# Patient Record
Sex: MALE | URBAN - METROPOLITAN AREA
[De-identification: names, ages, dates, MRNs, and addresses within clinical notes are randomized per-mention and may not be internally consistent; named-entity substitution may affect disease eponyms.]

---

## 2023-02-09 ENCOUNTER — HOSPITAL ENCOUNTER (EMERGENCY)
Age: 58
Discharge: HOME | End: 2023-02-09
Attending: EMERGENCY MEDICINE
Payer: COMMERCIAL

## 2023-02-09 VITALS
HEIGHT: 74 IN | WEIGHT: 190 LBS | RESPIRATION RATE: 18 BRPM | OXYGEN SATURATION: 98 % | DIASTOLIC BLOOD PRESSURE: 73 MMHG | TEMPERATURE: 98 F | SYSTOLIC BLOOD PRESSURE: 142 MMHG | HEART RATE: 107 BPM | BODY MASS INDEX: 24.38 KG/M2

## 2023-02-09 DIAGNOSIS — J01.00 ACUTE MAXILLARY SINUSITIS, RECURRENCE NOT SPECIFIED: Primary | ICD-10-CM

## 2023-02-09 PROCEDURE — 99283 EMERGENCY DEPT VISIT LOW MDM: CPT

## 2023-02-09 RX ORDER — TAMSULOSIN HYDROCHLORIDE 0.4 MG/1
CAPSULE ORAL
COMMUNITY

## 2023-02-09 RX ORDER — AMOXICILLIN AND CLAVULANATE POTASSIUM 875; 125 MG/1; MG/1
1 TABLET, FILM COATED ORAL 2 TIMES DAILY
Qty: 20 TABLET | Refills: 0 | Status: SHIPPED | OUTPATIENT
Start: 2023-02-09 | End: 2023-02-19

## 2024-08-01 ENCOUNTER — APPOINTMENT (OUTPATIENT)
Dept: GENERAL RADIOLOGY | Age: 59
End: 2024-08-01
Attending: PHYSICIAN ASSISTANT
Payer: COMMERCIAL

## 2024-08-01 ENCOUNTER — HOSPITAL ENCOUNTER (EMERGENCY)
Age: 59
Discharge: HOME | End: 2024-08-01
Payer: COMMERCIAL

## 2024-08-01 VITALS
DIASTOLIC BLOOD PRESSURE: 72 MMHG | BODY MASS INDEX: 25.67 KG/M2 | WEIGHT: 200 LBS | HEART RATE: 75 BPM | OXYGEN SATURATION: 96 % | SYSTOLIC BLOOD PRESSURE: 129 MMHG | RESPIRATION RATE: 18 BRPM | HEIGHT: 74 IN | TEMPERATURE: 98 F

## 2024-08-01 DIAGNOSIS — S70.01XA CONTUSION OF RIGHT HIP, INITIAL ENCOUNTER: Primary | ICD-10-CM

## 2024-08-01 DIAGNOSIS — S39.012A STRAIN OF LUMBAR REGION, INITIAL ENCOUNTER: ICD-10-CM

## 2024-08-01 PROCEDURE — 99284 EMERGENCY DEPT VISIT MOD MDM: CPT

## 2024-08-01 PROCEDURE — 99283 EMERGENCY DEPT VISIT LOW MDM: CPT

## 2024-08-01 PROCEDURE — 73502 X-RAY EXAM HIP UNI 2-3 VIEWS: CPT | Performed by: PHYSICIAN ASSISTANT

## 2024-08-01 RX ORDER — IBUPROFEN 600 MG/1
600 TABLET ORAL EVERY 8 HOURS PRN
Qty: 21 TABLET | Refills: 0 | Status: SHIPPED | OUTPATIENT
Start: 2024-08-01 | End: 2024-08-08

## 2024-08-01 RX ORDER — CYCLOBENZAPRINE HCL 10 MG
10 TABLET ORAL 2 TIMES DAILY PRN
Qty: 10 TABLET | Refills: 0 | Status: SHIPPED | OUTPATIENT
Start: 2024-08-01

## 2024-08-01 RX ORDER — TRAMADOL HYDROCHLORIDE 50 MG/1
50 TABLET ORAL NIGHTLY PRN
Qty: 10 TABLET | Refills: 0 | Status: SHIPPED | OUTPATIENT
Start: 2024-08-01

## 2024-08-02 NOTE — ED INITIAL ASSESSMENT (HPI)
Patient here with right lower back pain. States he fell while playing hockey on Sunday. States pain has gotten worse. Able to move all extremities. Sensation intact to bilateral legs. Ambulatory.

## 2024-08-02 NOTE — ED PROVIDER NOTES
Patient Seen in: [facility] [place]      History     Chief Complaint   Patient presents with    Back Pain     Stated Complaint: fell Sunday while playing hockey, right lower back pain    Subjective:   HPI  Nick Garcia is a 59 year old male here for evluation of right sided low back pain with radiation right hip due to injury resulting from mechanical fall while playing hockey 4 days.    No reported numbness, tingling, parasthesias, skin, color, temperature, sensory changes, saul/ bladder dysfunction, loss of bowel/ bladder control, saddle anesthesia.  No medications taken prior to arrival.   Pain 5/10 worsened with right hip flexion, extension, weight bearing.   Otherwise patient denies history of cancer, unexplained weight loss, IV drug abuse, systemic complaints.        Objective:   History reviewed. No pertinent past medical history.           History reviewed. No pertinent surgical history.             Social History     Socioeconomic History    Marital status: Single   Tobacco Use    Smoking status: Never    Smokeless tobacco: Never   Vaping Use    Vaping status: Never Used   Substance and Sexual Activity    Alcohol use: Yes     Comment: \"couple times a week\"    Drug use: Never              Review of Systems   All other systems reviewed and are negative.      Positive for stated Chief Complaint: Back Pain    Other systems are as noted in HPI.  Constitutional and vital signs reviewed.      All other systems reviewed and negative except as noted above.    Physical Exam     ED Triage Vitals [08/01/24 2058]   /72   Pulse 75   Resp 18   Temp 97.5 °F (36.4 °C)   Temp src Temporal   SpO2 96 %   O2 Device None (Room air)       Current Vitals:   Vital Signs  BP: 129/72  Pulse: 75  Resp: 18  Temp: 97.5 °F (36.4 °C)  Temp src: Temporal    Oxygen Therapy  SpO2: 96 %  O2 Device: None (Room air)            Physical Exam  Vitals and nursing note reviewed.   Constitutional:       General: He  is not in acute distress.     Appearance: Normal appearance. He is normal weight. He is not ill-appearing, toxic-appearing or diaphoretic.   HENT:      Head: Normocephalic and atraumatic.      Nose: Nose normal.   Eyes:      Extraocular Movements: Extraocular movements intact.      Pupils: Pupils are equal, round, and reactive to light.   Cardiovascular:      Rate and Rhythm: Normal rate.      Pulses: Normal pulses.   Pulmonary:      Effort: Pulmonary effort is normal.      Breath sounds: Normal breath sounds.   Musculoskeletal:         General: Tenderness present. No swelling, deformity or signs of injury. Normal range of motion.      Cervical back: Normal range of motion and neck supple.      Comments: Tenderness to palpation over greater trochanter right hip/right lower extremity.  Right hip flexion 4/5.    Right side lumbosacral  paraspinal tenderness to palpation.  No step offs, deformities, crepitus, midline tenderness.  Full back flexion/ extension      Skin:     General: Skin is warm and dry.      Capillary Refill: Capillary refill takes less than 2 seconds.      Coloration: Skin is not jaundiced or pale.      Findings: No bruising, erythema, lesion or rash.   Neurological:      General: No focal deficit present.      Mental Status: He is alert and oriented to person, place, and time. Mental status is at baseline.   Psychiatric:         Mood and Affect: Mood normal.         Behavior: Behavior normal.         Thought Content: Thought content normal.         Judgment: Judgment normal.               ED Course   Labs Reviewed - No data to display  XR HIP W OR WO PELVIS 2 OR 3 VIEWS, RIGHT (CPT=73502)   Final Result   PROCEDURE:  XR HIP W OR WO PELVIS 2 OR 3 VIEWS, RIGHT ( CPT=73502)       TECHNIQUE:  Unilateral 2 to 3 views of the hip and pelvis if performed.       COMPARISON:  None.       INDICATIONS:  right hip injury due to fall.  rule out fracture vs    dislocation       PATIENT STATED HISTORY: (As  transcribed by Technologist)  Patient states    he fell 7/28 playing hockey. He is having pain to the posterior aspect of    his right buttocks. He states he has pain when trying to lift his leg when    he squats down.                                   =====   CONCLUSION:         Negative for fracture or malalignment of the pelvis or hips.  Mild    osteoarthritis of bilateral hips with a combination of joint space loss    and marginal spurring.  Obturator rings are intact.  Normal sacroiliac    joints and pubic symphysis.           LOCATION:  [facility]           Dictated by (CST): Deepthi Martins MD on 8/01/2024 at 10:02 PM        Finalized by (CST): Deepthi Martins MD on 8/01/2024 at 10:02 PM                ED Course as of 08/01/24 2225  ------------------------------------------------------------  Time: 08/01 2204  Value: XR HIP W OR WO PELVIS 2 OR 3 VIEWS, RIGHT (CPT=73502)  Comment: No fracture or dislocation of right hip based my independent interpretation              MDM                                         Medical Decision Making  59-year-old well-appearing male presents with acute right-sided lumbosacral back pain with radiation to right hip that started 4 days prior to arrival after mechanical fall while playing hockey.  Considerations to include cauda equina versus piriformis syndrome versus groin strain versus hip contusion versus hip fracture versus lumbar strain.  Currently patient denies weakness, saddle anesthesia, bowel/bladder incontinence, hematuria, dysuria, fevers, chills.    Plan   -  x ray: right hip with pelvis.   - Alternate between ice and heat to affected region   - Rx:  ibuprofen 600mg po together q 8 hours/ prn. Tramadol 50mg po sparingly for breakthrough pain only . Flexeril 10mg PO BID/ PRN.    -Apply for no more than 12 consecutive hours   -Do not apply ice and/ or heat on top of Lidoderm patch   -Do not apply to broken skin   -OTC: Tylenol 1g po q 6 hours/ prn.   - refer to orthopaedics/ PCP  as needed  - explained to patient that if symptoms do not improve that an MRI may be warranted however that will be determined at the discretion of follow up care  - RICE:  Alternate between ice and heat 4-5 times daily or as needed for no longer than 5-10 minutes at a time.   When applying ice apply a layer of cloth between skin and cold source in order to prevent tissue irritation  - Return to ED if symptoms worsen                Disposition and Plan     Clinical Impression:  1. Contusion of right hip, initial encounter    2. Strain of lumbar region, initial encounter         Disposition:  There is no disposition on file for this visit.  There is no disposition time on file for this visit.    Follow-up:  Janessa Campos MD  [address] [place] [id number]  [phone]    Follow up      Janessa Campos MD  [address] [address]  [phone]          [facility] [place]  [address] [facility] [address]  [phone]  Follow up            Medications Prescribed:  Current Discharge Medication List        START taking these medications    Details   ibuprofen 600 MG Oral Tab Take 1 tablet (600 mg total) by mouth every 8 (eight) hours as needed.  Qty: 21 tablet, Refills: 0      traMADol 50 MG Oral Tab Take 1 tablet (50 mg total) by mouth nightly as needed.  Qty: 10 tablet, Refills: 0    Associated Diagnoses: Contusion of right hip, initial encounter      cyclobenzaprine 10 MG Oral Tab Take 1 tablet (10 mg total) by mouth 2 (two) times daily as needed for Muscle spasms.  Qty: 10 tablet, Refills: 0